# Patient Record
Sex: FEMALE | Race: WHITE | Employment: FULL TIME | URBAN - METROPOLITAN AREA
[De-identification: names, ages, dates, MRNs, and addresses within clinical notes are randomized per-mention and may not be internally consistent; named-entity substitution may affect disease eponyms.]

---

## 2020-02-04 ENCOUNTER — THERAPY VISIT (OUTPATIENT)
Dept: PHYSICAL THERAPY | Facility: CLINIC | Age: 35
End: 2020-02-04
Payer: COMMERCIAL

## 2020-02-04 DIAGNOSIS — M25.512 ACUTE PAIN OF LEFT SHOULDER: ICD-10-CM

## 2020-02-04 PROCEDURE — 97161 PT EVAL LOW COMPLEX 20 MIN: CPT | Mod: GP | Performed by: PHYSICAL THERAPIST

## 2020-02-04 PROCEDURE — 97112 NEUROMUSCULAR REEDUCATION: CPT | Mod: GP | Performed by: PHYSICAL THERAPIST

## 2020-02-04 PROCEDURE — 97110 THERAPEUTIC EXERCISES: CPT | Mod: GP | Performed by: PHYSICAL THERAPIST

## 2020-02-04 NOTE — PROGRESS NOTES
[facility] Initial Evaluation  Subjective:  The history is provided by the patient. No  was used.   Type of problem:  Left shoulder   Condition occurred with:  Contact with another person. This is a new condition   Problem details: 1/12/20 pt had to reach out to grab a medium to large size dog that was lunging at her son.  She felt immediate throbbing to the shoulder.  Pt is left handed.  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  She has had 2 cortisone injections in 2012..   Patient reports pain:  Anterior and lateral.  Associated symptoms:  Loss of strength. Symptoms are exacerbated by using arm overhead, using arm at shoulder level, using arm behind back, lifting, carrying and lying on extremity (child cares (10 months), weight lifting, jogging) and relieved by rest.    Jodi MREINO Chan being seen for L shoulder pain.   Problem began 1/12/2020. Where condition occurred: at home.Problem occurred: grabbing a dog  Pain score: 0-5/10. General health as reported by patient is good. Pertinent medical history includes:  None.    Surgeries include:  None.  Current medications:  None.   Primary job tasks include:  Computer work, lifting/carrying and prolonged sitting.  Pain is described as aching and sharp and is intermittent. Pain is the same all the time. Since onset symptoms are unchanged.  Previous treatment includes chiropractic. There was mild improvement following previous treatment.   Patient is . Restrictions include:  Working in normal job without restrictions.    Barriers include:  None as reported by patient.  Red flags:  None as reported by patient.                      Objective:  Standing Alignment:      Shoulder/UE:  Protracted scapula L                  Flexibility/Screens:     Upper Extremity:    Decreased left upper extremity flexibility at:  Pectoralis Minor                              Shoulder Evaluation:  ROM:  AROM:    Flexion:  Left:  147 ERP    Right:  157 ERP    Abduction:  Left: 147 ERP   Right:  144 ERP      External Rotation:  Left:  68    Right:  75          Flexion/External Rotation:  Left:  T3    Right:  T3  Extension/Internal Rotation:  Left:  T10 pain    Right:  T7          Strength:    Flexion: Left:5/5   Pain:      Extension:  Left: 5/5    Pain:      Abduction:  Left: 5/5  Pain:      Adduction:  Left: 5/5    Pain:      Internal Rotation:  Left:5/5     Pain:      External Rotation:   Left:4/5     Pain:             Stability Testing:  normal      Special Tests:  Special tests assessed shoulder: Neg Speeds and O'jb L.  Left shoulder positive for the following special tests:  Impingement (+ IR; neg FF, add, and ER) and Rotator cuff tear (weak and painful with belly press; neg drop arm, mild weakness with ER)  Left shoulder negative for the following special tests:  Labral  Right shoulder positive for the following special tests:Impingement (+ Neer; neg IR, add, and ER)  Palpation:  normal      Mobility Tests:  normal                                                 General     ROS    Assessment/Plan:    Patient is a 34 year old female with left side shoulder complaints.    Patient has the following significant findings with corresponding treatment plan.                Diagnosis 1:  L shoulder RC strain  Pain -  hot/cold therapy and home program  Decreased ROM/flexibility - manual therapy and therapeutic exercise  Decreased strength - therapeutic exercise and therapeutic activities  Decreased proprioception - neuro re-education and therapeutic activities  Inflammation - cold therapy and self management/home program  Impaired muscle performance - neuro re-education  Decreased function - therapeutic activities  Impaired posture - neuro re-education    Therapy Evaluation Codes:   1) History comprised of:   Personal factors that impact the plan of care:      None.    Comorbidity factors  that impact the plan of care are:      None.     Medications impacting care: None.  2) Examination of Body Systems comprised of:   Body structures and functions that impact the plan of care:      Shoulder.   Activity limitations that impact the plan of care are:      Bathing, Dressing, Lifting, Reading/Computer work, Running, Sports, Throwing, Working, Sleeping and reaching.  3) Clinical presentation characteristics are:   Stable/Uncomplicated.  4) Decision-Making    Low complexity using standardized patient assessment instrument and/or measureable assessment of functional outcome.  Cumulative Therapy Evaluation is: Low complexity.    Previous and current functional limitations:  (See Goal Flow Sheet for this information)    Short term and Long term goals: (See Goal Flow Sheet for this information)     Communication ability:  Patient appears to be able to clearly communicate and understand verbal and written communication and follow directions correctly.  Treatment Explanation - The following has been discussed with the patient:   RX ordered/plan of care  Anticipated outcomes  Possible risks and side effects  This patient would benefit from PT intervention to resume normal activities.   Rehab potential is good.    Frequency:  1 X week, once daily  Duration:  for 8 weeks  Discharge Plan:  Achieve all LTG.  Independent in home treatment program.  Reach maximal therapeutic benefit.    Please refer to the daily flowsheet for treatment today, total treatment time and time spent performing 1:1 timed codes.

## 2020-02-04 NOTE — LETTER
[facility]  [address] [address] [place] [id number]  [phone]    2020    Re: Jodi Chan   :   1985  MRN:  [id number]   REFERRING PHYSICIAN:   Meme Gonzalez DC    [facility]  Date of Initial Evaluation:  2020  Visits:  Rxs Used: 1  Reason for Referral:  Acute pain of left shoulder    EVALUATION SUMMARY    [facility] Initial Evaluation  Subjective:  The history is provided by the patient. No  was used.   Type of problem:  Left shoulder.  Condition occurred with:  Contact with another person. This is a new condition   Problem details: 20 pt had to reach out to grab a medium to large size dog that was lunging at her son.  She felt immediate throbbing to the shoulder.  Pt is left handed.  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  She has had 2 cortisone injections in ..   Patient reports pain:  Anterior and lateral.  Associated symptoms:  Loss of strength. Symptoms are exacerbated by using arm overhead, using arm at shoulder level, using arm behind back, lifting, carrying and lying on extremity (child cares (10 months), weight lifting, jogging) and relieved by rest.    Jodi Chan being seen for L shoulder pain. Problem began 2020. Where condition occurred: at home.Problem occurred: grabbing a dog  Pain score: 0-5/10. General health as reported by patient is good. Pertinent medical history includes:  None.    Surgeries include:  None.  Current medications:  None.   Primary job tasks include:  Computer work, lifting/carrying and prolonged sitting.  Pain is described as aching and sharp and is intermittent. Pain is the same all the time. Since onset symptoms are unchanged.  Previous treatment includes chiropractic. There was mild improvement following previous treatment.   Patient is . Restrictions  include:  Working in normal job without restrictions.  Barriers include:  None as reported by patient.  Red flags:  None as reported by patient.    Objective:  Standing Alignment:    Shoulder/UE:  Protracted scapula L  Flexibility/Screens:   Upper Extremity:    Decreased left upper extremity flexibility at:  Pectoralis Minor  Shoulder Evaluation:  ROM:  AROM:    Flexion:  Left:  147 ERP    Right:  157 ERP  Abduction:  Left: 147 ERP   Right:  144 ERP  External Rotation:  Left:  68    Right:  75  Flexion/External Rotation:  Left:  T3    Right:  T3  Extension/Internal Rotation:  Left:  T10 pain    Right:  T7    Strength:    Flexion: Left:5/5   Pain:      Extension:  Left: 5/5    Pain:      Abduction:  Left: 5/5  Pain:      Adduction:  Left: 5/5    Pain:      Internal Rotation:  Left:5/5     Pain:      External Rotation:   Left:4/5     Pain:     Stability Testing:  normal  Special Tests:  Special tests assessed shoulder: Neg Speeds and O'jb L.  Left shoulder positive for the following special tests:  Impingement (+ IR; neg FF, add, and ER) and Rotator cuff tear (weak and painful with belly press; neg drop arm, mild weakness with ER)  Left shoulder negative for the following special tests:  Labral  Right shoulder positive for the following special tests:Impingement (+ Neer; neg IR, add, and ER)  Palpation:  normal  Mobility Tests:  normal    Assessment/Plan:    Patient is a 34 year old female with left side shoulder complaints.    Patient has the following significant findings with corresponding treatment plan.                Diagnosis 1:  L shoulder RC strain  Pain -  hot/cold therapy and home program  Decreased ROM/flexibility - manual therapy and therapeutic exercise  Decreased strength - therapeutic exercise and therapeutic activities  Decreased proprioception - neuro re-education and therapeutic activities  Inflammation - cold therapy and self management/home program  Impaired muscle performance - neuro  re-education  Decreased function - therapeutic activities  Impaired posture - neuro re-education    Therapy Evaluation Codes:   1) History comprised of:   Personal factors that impact the plan of care:      None.    Comorbidity factors that impact the plan of care are:      None.     Medications impacting care: None.  2) Examination of Body Systems comprised of:   Body structures and functions that impact the plan of care:      Shoulder.   Activity limitations that impact the plan of care are:      Bathing, Dressing, Lifting, Reading/Computer work, Running, Sports, Throwing, Working, Sleeping and reaching.  3) Clinical presentation characteristics are:   Stable/Uncomplicated.  4) Decision-Making    Low complexity using standardized patient assessment instrument and/or measureable assessment of functional outcome.  Cumulative Therapy Evaluation is: Low complexity.    Previous and current functional limitations:  (See Goal Flow Sheet for this information)    Short term and Long term goals: (See Goal Flow Sheet for this information)     Communication ability:  Patient appears to be able to clearly communicate and understand verbal and written communication and follow directions correctly.  Treatment Explanation - The following has been discussed with the patient:   RX ordered/plan of care  Anticipated outcomes  Possible risks and side effects  This patient would benefit from PT intervention to resume normal activities.   Rehab potential is good.    Frequency:  1 X week, once daily  Duration:  for 8 weeks  Discharge Plan:  Achieve all LTG.  Independent in home treatment program.  Reach maximal therapeutic benefit.    Thank you for your referral.    INQUIRIES  Therapist: Amairani Bui, PT, ATC   [address] [address]  Phone: [phone]  Fax: [phone]

## 2020-02-14 ENCOUNTER — THERAPY VISIT (OUTPATIENT)
Dept: PHYSICAL THERAPY | Facility: CLINIC | Age: 35
End: 2020-02-14
Payer: COMMERCIAL

## 2020-02-14 DIAGNOSIS — M25.512 ACUTE PAIN OF LEFT SHOULDER: ICD-10-CM

## 2020-02-14 PROCEDURE — 97112 NEUROMUSCULAR REEDUCATION: CPT | Mod: GP | Performed by: PHYSICAL THERAPIST

## 2020-02-14 PROCEDURE — 97110 THERAPEUTIC EXERCISES: CPT | Mod: GP | Performed by: PHYSICAL THERAPIST

## 2020-02-19 ENCOUNTER — THERAPY VISIT (OUTPATIENT)
Dept: PHYSICAL THERAPY | Facility: CLINIC | Age: 35
End: 2020-02-19
Payer: COMMERCIAL

## 2020-02-19 DIAGNOSIS — M25.512 ACUTE PAIN OF LEFT SHOULDER: ICD-10-CM

## 2020-02-19 PROCEDURE — 97110 THERAPEUTIC EXERCISES: CPT | Mod: GP | Performed by: PHYSICAL THERAPIST

## 2020-02-19 PROCEDURE — 97112 NEUROMUSCULAR REEDUCATION: CPT | Mod: GP | Performed by: PHYSICAL THERAPIST

## 2020-03-11 ENCOUNTER — THERAPY VISIT (OUTPATIENT)
Dept: PHYSICAL THERAPY | Facility: CLINIC | Age: 35
End: 2020-03-11
Payer: COMMERCIAL

## 2020-03-11 DIAGNOSIS — M25.512 ACUTE PAIN OF LEFT SHOULDER: ICD-10-CM

## 2020-03-11 PROCEDURE — 97112 NEUROMUSCULAR REEDUCATION: CPT | Mod: GP | Performed by: PHYSICAL THERAPIST

## 2020-03-11 PROCEDURE — 97110 THERAPEUTIC EXERCISES: CPT | Mod: GP | Performed by: PHYSICAL THERAPIST

## 2020-03-11 NOTE — LETTER
[facility]  [address] [address] [place] [id number]  [phone]    2020    Re: Jodi Chan   :   1985  MRN:  [id number]   REFERRING PHYSICIAN:   Meme Yancey    [facility]  Date of Initial Evaluation:  20  Visits:  Rxs Used: 4  Reason for Referral:  Acute pain of left shoulder    DISCHARGE REPORT  Progress reporting period is from 20 to 3/11/20.     SUBJECTIVE  Subjective: Steady progress with shoulder.  Is reporting some medial elbow pain.       Initial Pain level: (0-5/10)   Changes in function: Yes, see goal flow sheet for change in function   Adverse reactions: None;   ,     The subjective and objective information are from the last SOAP note on this patient.    OBJECTIVE  Objective: L shoulder AROM normal and pain free.  MMT all .  Neg impingement.  + trigger point to L UT/levator.  TTP just prox to med epicondyle.  MMT L elbow .  [facility] car seat.      Treatment stopped d/t COVID.  Current status unknown.    ASSESSMENT/PLAN  Updated problem list and treatment plan: Diagnosis 1:  Shoulder pain  Pain -  hot/cold therapy and home program  Decreased ROM/flexibility - manual therapy and therapeutic exercise  Decreased strength - therapeutic exercise and therapeutic activities  Decreased proprioception - neuro re-education and therapeutic activities  Inflammation - cold therapy and self management/home program  Impaired muscle performance - neuro re-education  Decreased function - therapeutic activities  Impaired posture - neuro re-education  STG/LTGs have been met or progress has been made towards goals:  Yes (See Goal flow sheet completed today.)  Assessment of Progress: The patient has not returned to therapy. Current status is unknown.  Self Management Plans:  Patient has been instructed in a home treatment program.  Patient  has been instructed in self management of symptoms.  I have  re-evaluated this patient and find that the nature, scope, duration and intensity of the therapy is appropriate for the medical condition of the patient.  Jodi continues to require the following intervention to meet STG and LTG's: PT intervention is no longer required to meet STG/LTG.  We will discharge this patient from PT.        Re: Jodi Chan   :   1985      Recommendations:  Discharge.    Please refer to the daily flowsheet for treatment today, total treatment time and time spent performing 1:1 timed codes.      Thank you for your referral.    INQUIRIES  Therapist: Amairani Bui, PT   [address] [address]  Phone: [phone]  Fax: [phone]

## 2020-04-08 ENCOUNTER — TELEPHONE (OUTPATIENT)
Dept: PHYSICAL THERAPY | Facility: CLINIC | Age: 35
End: 2020-04-08

## 2020-04-08 NOTE — TELEPHONE ENCOUNTER
Called and left voicemail. Provided pt with [facility] number to call if interested in virtual PT or to let us know if she wants to be [facility].

## 2020-04-17 ENCOUNTER — TELEPHONE (OUTPATIENT)
Dept: PHYSICAL THERAPY | Facility: CLINIC | Age: 35
End: 2020-04-17

## 2020-04-30 ENCOUNTER — TELEPHONE (OUTPATIENT)
Dept: PHYSICAL THERAPY | Facility: CLINIC | Age: 35
End: 2020-04-30

## 2020-06-24 ENCOUNTER — TELEPHONE (OUTPATIENT)
Dept: PHYSICAL THERAPY | Facility: CLINIC | Age: 35
End: 2020-06-24

## 2020-08-05 PROBLEM — M25.512 ACUTE PAIN OF LEFT SHOULDER: Status: RESOLVED | Noted: 2020-02-04 | Resolved: 2020-08-05

## 2020-08-05 NOTE — PROGRESS NOTES
Subjective:  HPI  Physical Exam                    Objective:  System    Physical Exam    General     ROS    Assessment/Plan:    DISCHARGE REPORT    Progress reporting period is from 2/4/20 to 3/11/20.     SUBJECTIVE  Subjective: Steady progress with shoulder.  Is reporting some medial elbow pain.       Initial Pain level: (0-5/10)   Changes in function: Yes, see goal flow sheet for change in function   Adverse reactions: None;   ,     The subjective and objective information are from the last SOAP note on this patient.    OBJECTIVE  Objective: L shoulder AROM normal and pain free.  MMT all 5/5.  Neg impingement.  + trigger point to L UT/levator.  TTP just prox to med epicondyle.  MMT L elbow 5/5.  Discussion on better mech with carrying child car seat.      Treatment stopped d/t COVID.  Current status unknown.    ASSESSMENT/PLAN  Updated problem list and treatment plan: Diagnosis 1:  Shoulder pain  Pain -  hot/cold therapy and home program  Decreased ROM/flexibility - manual therapy and therapeutic exercise  Decreased strength - therapeutic exercise and therapeutic activities  Decreased proprioception - neuro re-education and therapeutic activities  Inflammation - cold therapy and self management/home program  Impaired muscle performance - neuro re-education  Decreased function - therapeutic activities  Impaired posture - neuro re-education  STG/LTGs have been met or progress has been made towards goals:  Yes (See Goal flow sheet completed today.)  Assessment of Progress: The patient has not returned to therapy. Current status is unknown.  Self Management Plans:  Patient has been instructed in a home treatment program.  Patient  has been instructed in self management of symptoms.  I have re-evaluated this patient and find that the nature, scope, duration and intensity of the therapy is appropriate for the medical condition of the patient.  Jodi continues to require the following intervention to meet STG and  LTG's: PT intervention is no longer required to meet STG/LTG.  We will discharge this patient from PT.    Recommendations:  Discharge.    Please refer to the daily flowsheet for treatment today, total treatment time and time spent performing 1:1 timed codes.